# Patient Record
Sex: MALE | Race: BLACK OR AFRICAN AMERICAN | Employment: OTHER | URBAN - METROPOLITAN AREA
[De-identification: names, ages, dates, MRNs, and addresses within clinical notes are randomized per-mention and may not be internally consistent; named-entity substitution may affect disease eponyms.]

---

## 2021-05-31 ENCOUNTER — RECORDS - HEALTHEAST (OUTPATIENT)
Dept: ADMINISTRATIVE | Facility: CLINIC | Age: 71
End: 2021-05-31

## 2021-06-01 ENCOUNTER — RECORDS - HEALTHEAST (OUTPATIENT)
Dept: ADMINISTRATIVE | Facility: CLINIC | Age: 71
End: 2021-06-01

## 2021-08-27 ENCOUNTER — THERAPY VISIT (OUTPATIENT)
Dept: PHYSICAL THERAPY | Facility: CLINIC | Age: 71
End: 2021-08-27
Payer: COMMERCIAL

## 2021-08-27 DIAGNOSIS — M54.50 BILATERAL LOW BACK PAIN WITHOUT SCIATICA: Primary | ICD-10-CM

## 2021-08-27 PROCEDURE — 97162 PT EVAL MOD COMPLEX 30 MIN: CPT | Mod: GP | Performed by: PHYSICAL THERAPIST

## 2021-08-27 PROCEDURE — 97110 THERAPEUTIC EXERCISES: CPT | Mod: GP | Performed by: PHYSICAL THERAPIST

## 2021-08-27 NOTE — PROGRESS NOTES
Physical Therapy Initial Evaluation  Subjective:  The history is provided by the patient. No  was used.   Patient Health History  Fadumo Partida being seen for low back pain.     Problem began: 1/1/2018.   Problem occurred: Pt had a stroke three years ago, which severely affected his R side of his body. Since that time pt has had low back pain.  Pt describes low back pain radiating to both knees.   Pain is reported as 8/10 on pain scale.  General health as reported by patient is good.  Pertinent medical history includes: stroke.   Red flags:  None as reported by patient.         Current medications: naprpoxen.    Current occupation is not working due to disability from stroke.                     Therapist Generated HPI Evaluation         Type of problem:  Lumbar.          Patient reports pain:  Lower lumbar spine, central lumbar spine, lumbar spine left and lumbar spine right.  Pain is described as aching and is intermittent.  Pain radiates to:  Gluteals right, thigh left, thigh right, knee left and knee right.   Since onset symptoms are unchanged.  Associated symptoms:  Loss of strength and loss of motion/stiffness. Symptoms are exacerbated by standing and walking  Relieved by: sitting.  Special tests included:  X-ray.    Restrictions due to condition include:  Currently not working due to present treatment.  Barriers include:  None as reported by patient.                        Objective:        Flexibility/Screens:       Lower Extremity:  Decreased left lower extremity flexibility:Adductors; Hip Flexors; Hamstrings and Gastroc    Decreased right lower extremity flexibility:  Adductors; Hip Flexors; Hamstrings and Gastroc          Physical Exam      [place] Lumbar Evaluation    Posture:  Sitting: fair  Standing: fair  Lordosis: WNL  Lateral Shift: no      Movement Loss:  Flexion (Flex): min and pain  Extension (EXT): min and pain  Side [place] R (SG R): min and pain  Side [place] L (SG L): min  and pain  Test Movements:  FIS:   Repeat FIS: During: produces  After: worse    EIS:   Repeat EIS: During: produces  After: no worse              Conclusion: other  Principle of Treatment:          Other: x - testing extension since RFISitting made him worse                                       ROS    Assessment/Plan:    Patient is a 71 year old male with lumbar, both sides hip and both sides knee complaints.    Patient has the following significant findings with corresponding treatment plan.                Diagnosis 1:  Low back pain radiating down to B knees      Pain -  hot/cold therapy, manual therapy, self management, education, directional preference exercise and home program  Decreased ROM/flexibility - manual therapy and therapeutic exercise  Decreased strength - therapeutic exercise and therapeutic activities  Impaired gait - gait training  Impaired muscle performance - neuro re-education  Decreased function - therapeutic activities  Impaired posture - neuro re-education    Therapy Evaluation Codes:   1) History comprised of:   Personal factors that impact the plan of care:      Age, Past/current experiences and Time since onset of symptoms.    Comorbidity factors that impact the plan of care are:      Stroke.     Medications impacting care: Pain.  2) Examination of Body Systems comprised of:   Body structures and functions that impact the plan of care:      Hip, lumbar, and knee.   Activity limitations that impact the plan of care are:      Standing, Walking and Working.  3) Clinical presentation characteristics are:   Evolving/Changing.  4) Decision-Making    Moderate complexity using standardized patient assessment instrument and/or measureable assessment of functional outcome.  Cumulative Therapy Evaluation is: Moderate complexity.    Previous and current functional limitations:  (See Goal Flow Sheet for this information)    Short term and Long term goals: (See Goal Flow Sheet for this information)      Communication ability:  Patient appears to be able to clearly communicate and understand verbal and written communication and follow directions correctly.  Treatment Explanation - The following has been discussed with the patient:   RX ordered/plan of care  Anticipated outcomes  Possible risks and side effects  This patient would benefit from PT intervention to resume normal activities.   Rehab potential is fair.    Frequency:  1 X week, once daily  Duration:  for 6 weeks  Discharge Plan:  Achieve all LTG.  Independent in home treatment program.  Reach maximal therapeutic benefit.    Please refer to the daily flowsheet for treatment today, total treatment time and time spent performing 1:1 timed codes.

## 2021-08-27 NOTE — LETTER
[facility]  [address]  [address]  [place] [address]  [phone]    2021    Re: Fadumo Partida   :   1950  MRN:  [id number]   REFERRING PHYSICIAN:   Juan Antonio RAPHAEL [facility]    Date of Initial Evaluation:  21  Visits:  Rxs Used: 1  Reason for Referral:  Bilateral low back pain without sciatica    EVALUATION SUMMARY    Physical Therapy Initial Evaluation  Subjective:  The history is provided by the patient. No  was used.   Patient Health History  Fadumo Partida being seen for low back pain.     Problem began: 2018.   Problem occurred: Pt had a stroke three years ago, which severely affected his R side of his body. Since that time pt has had low back pain.  Pt describes low back pain radiating to both knees.   Pain is reported as 8/10 on pain scale.  General health as reported by patient is good.  Pertinent medical history includes: stroke.   Red flags:  None as reported by patient.    Current medications: naprpoxen.    Current occupation is not working due to disability from stroke.      Therapist Generated HPI Evaluation         Type of problem:  Lumbar.    Patient reports pain:  Lower lumbar spine, central lumbar spine, lumbar spine left and lumbar spine right.  Pain is described as aching and is intermittent.  Pain radiates to:  Gluteals right, thigh left, thigh right, knee left and knee right.   Since onset symptoms are unchanged.  Associated symptoms:  Loss of strength and loss of motion/stiffness. Symptoms are exacerbated by standing and walking  Relieved by: sitting.  Special tests included:  X-ray.    Restrictions due to condition include:  Currently not working due to present treatment.  Barriers include:  None as reported by patient.    Re: Fadumo Partida   :   1950                    Objective:    Flexibility/Screens:     Lower Extremity:  Decreased  left lower extremity flexibility:Adductors; Hip Flexors; Hamstrings and Gastroc    Decreased right lower extremity flexibility:  Adductors; Hip Flexors; Hamstrings and Gastroc    Physical Exam      [place] Lumbar Evaluation    Posture:  Sitting: fair  Standing: fair  Lordosis: WNL  Lateral Shift: no      Movement Loss:  Flexion (Flex): min and pain  Extension (EXT): min and pain  Side [place] R (SG R): min and pain  Side [place] L (SG L): min and pain  Test Movements:  FIS:   Repeat FIS: During: produces  After: worse    EIS:   Repeat EIS: During: produces  After: no worse      Conclusion: other  Principle of Treatment:    Other: x - testing extension since RFISitting made him worse      ROS    Assessment/Plan:    Patient is a 71 year old male with lumbar, both sides hip and both sides knee complaints.    Patient has the following significant findings with corresponding treatment plan.                Diagnosis 1:  Low back pain radiating down to B knees      Pain -  hot/cold therapy, manual therapy, self management, education, directional preference exercise and home program  Decreased ROM/flexibility - manual therapy and therapeutic exercise  Decreased strength - therapeutic exercise and therapeutic activities  Impaired gait - gait training  Re: Fadumo Partida   :   1950      Impaired muscle performance - neuro re-education  Decreased function - therapeutic activities  Impaired posture - neuro re-education    Therapy Evaluation Codes:   1) History comprised of:   Personal factors that impact the plan of care:      Age, Past/current experiences and Time since onset of symptoms.    Comorbidity factors that impact the plan of care are:      Stroke.     Medications impacting care: Pain.  2) Examination of Body Systems comprised of:   Body structures and functions that impact the plan of care:      Hip, lumbar, and knee.   Activity limitations that impact the plan of care are:      Standing, Walking and  Working.  3) Clinical presentation characteristics are:   Evolving/Changing.  4) Decision-Making    Moderate complexity using standardized patient assessment instrument and/or measureable assessment of functional outcome.  Cumulative Therapy Evaluation is: Moderate complexity.    Previous and current functional limitations:  (See Goal Flow Sheet for this information)    Short term and Long term goals: (See Goal Flow Sheet for this information)     Communication ability:  Patient appears to be able to clearly communicate and understand verbal and written communication and follow directions correctly.  Treatment Explanation - The following has been discussed with the patient:   RX ordered/plan of care  Anticipated outcomes  Possible risks and side effects  This patient would benefit from PT intervention to resume normal activities.   Rehab potential is fair.    Frequency:  1 X week, once daily  Duration:  for 6 weeks  Discharge Plan:  Achieve all LTG.  Independent in home treatment program.  Reach maximal therapeutic benefit.    Thank you for your referral.    INQUIRIES  Therapist: Joseluis Daniels PT  [facility] [address] [address]  Phone: [phone]  Fax: [phone]

## 2021-09-10 ENCOUNTER — THERAPY VISIT (OUTPATIENT)
Dept: PHYSICAL THERAPY | Facility: CLINIC | Age: 71
End: 2021-09-10
Payer: COMMERCIAL

## 2021-09-10 DIAGNOSIS — M54.50 BILATERAL LOW BACK PAIN WITHOUT SCIATICA: ICD-10-CM

## 2021-09-10 PROCEDURE — 97110 THERAPEUTIC EXERCISES: CPT | Mod: GP | Performed by: PHYSICAL THERAPIST

## 2022-01-18 PROBLEM — M54.50 BILATERAL LOW BACK PAIN WITHOUT SCIATICA: Status: RESOLVED | Noted: 2021-08-27 | Resolved: 2022-01-18

## 2022-01-18 NOTE — PROGRESS NOTES
Discharge Note    Progress reporting period is from initial evaluation date (please see noted date below) to Sep 10, 2021.  No linked episodes      Fadumo failed to follow up and current status is unknown.  Please see information below for last relevant information on current status.  Patient seen for 2 visits.    SUBJECTIVE  Subjective changes noted by patient:  Pt struggled a bit with HEP, was sick for a few days.  .  Current pain level is  .     Previous pain level was   .   Changes in function:  Yes (See Goal flowsheet attached for changes in current functional level)  Adverse reaction to treatment or activity: None    OBJECTIVE  Changes noted in objective findings: RFIL tolerated as a new HEP trial.      ASSESSMENT/PLAN  Diagnosis: Low back pain   STG/LTGs have been met or progress has been made towards goals:  Yes, please see goal flowsheet for most current information  Assessment of Progress: current status is unknown.    Last current status:     Self Management Plans:  HEP  I have re-evaluated this patient and find that the nature, scope, duration and intensity of the therapy is appropriate for the medical condition of the patient.  Fadumo continues to require the following intervention to meet STG and LTG's:  HEP.    Recommendations:  Discharge with current home program.  Patient to follow up with MD as needed.    Please refer to the daily flowsheet for treatment today, total treatment time and time spent performing 1:1 timed codes.